# Patient Record
Sex: FEMALE | Race: WHITE
[De-identification: names, ages, dates, MRNs, and addresses within clinical notes are randomized per-mention and may not be internally consistent; named-entity substitution may affect disease eponyms.]

---

## 2018-12-19 ENCOUNTER — HOSPITAL ENCOUNTER (EMERGENCY)
Dept: HOSPITAL 54 - ER | Age: 28
Discharge: LEFT BEFORE DISCHARGE | End: 2018-12-19
Payer: COMMERCIAL

## 2018-12-19 VITALS — BODY MASS INDEX: 28.7 KG/M2 | WEIGHT: 205 LBS | HEIGHT: 71 IN

## 2018-12-19 VITALS — DIASTOLIC BLOOD PRESSURE: 90 MMHG | SYSTOLIC BLOOD PRESSURE: 134 MMHG

## 2018-12-19 DIAGNOSIS — Z88.8: ICD-10-CM

## 2018-12-19 DIAGNOSIS — Z88.2: Primary | ICD-10-CM

## 2018-12-19 DIAGNOSIS — Z53.21: ICD-10-CM

## 2018-12-19 PROCEDURE — A4606 OXYGEN PROBE USED W OXIMETER: HCPCS

## 2018-12-19 PROCEDURE — Z7610: HCPCS

## 2018-12-19 NOTE — NUR
CALLED PT'S NAME THREE TIMES, NO RESPONSE. WAS INFORMED BY ADMITTING THAT 
PATIENT STATED SHE WAS GOING TO LEAVE.